# Patient Record
Sex: MALE | Race: BLACK OR AFRICAN AMERICAN | Employment: FULL TIME | URBAN - METROPOLITAN AREA
[De-identification: names, ages, dates, MRNs, and addresses within clinical notes are randomized per-mention and may not be internally consistent; named-entity substitution may affect disease eponyms.]

---

## 2023-06-05 ENCOUNTER — HOSPITAL ENCOUNTER (EMERGENCY)
Facility: HOSPITAL | Age: 38
Discharge: HOME | End: 2023-06-05
Attending: STUDENT IN AN ORGANIZED HEALTH CARE EDUCATION/TRAINING PROGRAM
Payer: OTHER GOVERNMENT

## 2023-06-05 VITALS
OXYGEN SATURATION: 100 % | DIASTOLIC BLOOD PRESSURE: 67 MMHG | TEMPERATURE: 98.4 F | SYSTOLIC BLOOD PRESSURE: 105 MMHG | HEART RATE: 98 BPM | RESPIRATION RATE: 18 BRPM | WEIGHT: 205 LBS | HEIGHT: 68 IN | BODY MASS INDEX: 31.07 KG/M2

## 2023-06-05 DIAGNOSIS — L50.9 URTICARIA: Primary | ICD-10-CM

## 2023-06-05 PROCEDURE — 99283 EMERGENCY DEPT VISIT LOW MDM: CPT

## 2023-06-05 RX ORDER — EPINEPHRINE 0.3 MG/.3ML
0.3 INJECTION SUBCUTANEOUS ONCE
Qty: 0.3 ML | Refills: 0 | Status: SHIPPED | OUTPATIENT
Start: 2023-06-05 | End: 2023-06-05

## 2023-06-05 ASSESSMENT — LIFESTYLE VARIABLES
HOW MANY STANDARD DRINKS CONTAINING ALCOHOL DO YOU HAVE ON A TYPICAL DAY: 3 OR 4
HOW OFTEN DO YOU HAVE A DRINK CONTAINING ALCOHOL: MONTHLY OR LESS

## 2023-06-05 ASSESSMENT — ENCOUNTER SYMPTOMS
SHORTNESS OF BREATH: 0
SORE THROAT: 0
RHINORRHEA: 0
FACIAL SWELLING: 0
COUGH: 0

## 2023-06-05 ASSESSMENT — PAIN - FUNCTIONAL ASSESSMENT: PAIN_FUNCTIONAL_ASSESSMENT: NONE - DENIES PAIN

## 2023-06-05 NOTE — ED TRIAGE NOTES
Pt arrives to the ED with c/o bee sting to his left calf. Progressively noticed bumps develop on bilateral arms and his top lip became swollen. Pt states it \"feels funny\" when he swallows. Took benadryl around 1900 (took 1 children's then 2 adult). States he has been stung in the past and never had a reaction like this. Pulse ox 94% on RA in triage.

## 2023-06-06 NOTE — ED PROVIDER NOTES
surgical history on file. CURRENT MEDICATIONS       Previous Medications    No medications on file       ALLERGIES     Sulfa antibiotics    FAMILY HISTORY     No family history on file. SOCIAL HISTORY       Social History     Socioeconomic History    Marital status:        PHYSICAL EXAM    (up to 7 for level 4, 8 or more for level 5)     ED Triage Vitals [06/05/23 1956]   BP Temp Temp Source Pulse Respirations SpO2 Height Weight - Scale   (!) 146/78 98.4 °F (36.9 °C) Oral 100 22 96 % 5' 8\" (1.727 m) 205 lb (93 kg)       Body mass index is 31.17 kg/m². Physical Exam  Vitals reviewed. Constitutional:       General: He is not in acute distress. Appearance: Normal appearance. He is not ill-appearing. HENT:      Head: Normocephalic and atraumatic. Nose: Nose normal. No congestion. Mouth/Throat:      Mouth: Mucous membranes are moist.      Pharynx: No posterior oropharyngeal erythema. Cardiovascular:      Rate and Rhythm: Normal rate. Pulmonary:      Effort: Pulmonary effort is normal. No respiratory distress. Breath sounds: Normal breath sounds. No wheezing, rhonchi or rales. Musculoskeletal:         General: Normal range of motion. Cervical back: Normal range of motion and neck supple. No rigidity or tenderness. Skin:     General: Skin is warm. Capillary Refill: Capillary refill takes less than 2 seconds. Comments: Hives noted on bilateral forearms   Neurological:      General: No focal deficit present. Mental Status: He is alert and oriented to person, place, and time. Mental status is at baseline. Sensory: No sensory deficit.       Gait: Gait normal.   Psychiatric:         Mood and Affect: Mood normal.       DIAGNOSTIC RESULTS     EKG: All EKG's are interpreted by the Emergency Department Physician who either signs or Co-signs this chart in the absence of a cardiologist.      LABS:  Labs Reviewed - No data to display    EMERGENCY DEPARTMENT

## 2023-06-06 NOTE — ED NOTES
I have reviewed discharge instructions with the patient. Opportunity for questions and clarification was provided. The patient verbalized understanding. Patient discharged out of the ED via ambulation with no difficulty and in stable condition. Patient is being discharged with paperwork and 1 prescription.         Vivi Villela RN  06/05/23 2051

## 2023-06-06 NOTE — DISCHARGE INSTRUCTIONS
Take benadryl 25 - 50 mg every 6 hours to help with itching and swelling. DO NOT DRIVE after taking medication. Use Epi-Pen if you start noticing oral swelling, shortness of breath, or difficulty breathing. Go to the nearest emergency department if this occurs. Follow-up with a primary care provider in 3-4 days. Call for appointment as soon as possible.